# Patient Record
Sex: FEMALE | Race: WHITE | Employment: OTHER | URBAN - METROPOLITAN AREA
[De-identification: names, ages, dates, MRNs, and addresses within clinical notes are randomized per-mention and may not be internally consistent; named-entity substitution may affect disease eponyms.]

---

## 2018-10-26 ENCOUNTER — NURSE ONLY (OUTPATIENT)
Dept: FAMILY MEDICINE CLINIC | Age: 48
End: 2018-10-26
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
## 2019-03-17 ENCOUNTER — OFFICE VISIT (OUTPATIENT)
Dept: FAMILY MEDICINE CLINIC | Age: 49
End: 2019-03-17
Payer: COMMERCIAL

## 2019-03-17 VITALS
HEART RATE: 91 BPM | TEMPERATURE: 98 F | RESPIRATION RATE: 18 BRPM | SYSTOLIC BLOOD PRESSURE: 122 MMHG | OXYGEN SATURATION: 99 % | DIASTOLIC BLOOD PRESSURE: 78 MMHG | HEIGHT: 63 IN | WEIGHT: 150 LBS | BODY MASS INDEX: 26.58 KG/M2

## 2019-03-17 DIAGNOSIS — S61.019A LACERATION OF THUMB, FOREIGN BODY PRESENCE UNSPECIFIED, NAIL DAMAGE STATUS UNSPECIFIED, UNSPECIFIED LATERALITY, INITIAL ENCOUNTER: Primary | ICD-10-CM

## 2019-03-17 PROCEDURE — 90471 IMMUNIZATION ADMIN: CPT | Performed by: NURSE PRACTITIONER

## 2019-03-17 PROCEDURE — 99214 OFFICE O/P EST MOD 30 MIN: CPT | Performed by: NURSE PRACTITIONER

## 2019-03-17 PROCEDURE — 12001 RPR S/N/AX/GEN/TRNK 2.5CM/<: CPT | Performed by: NURSE PRACTITIONER

## 2019-03-17 PROCEDURE — 90715 TDAP VACCINE 7 YRS/> IM: CPT | Performed by: NURSE PRACTITIONER

## 2019-03-17 RX ORDER — ZOLPIDEM TARTRATE 5 MG/1
TABLET ORAL
COMMUNITY
Start: 2019-03-15 | End: 2019-03-17

## 2019-03-17 RX ORDER — CEPHALEXIN 500 MG/1
500 CAPSULE ORAL 3 TIMES DAILY
Qty: 21 CAPSULE | Refills: 0 | Status: SHIPPED | OUTPATIENT
Start: 2019-03-17 | End: 2020-05-14

## 2019-03-17 RX ORDER — ALPRAZOLAM 0.5 MG/1
0.5 TABLET ORAL
COMMUNITY
Start: 2019-02-15 | End: 2019-03-17

## 2019-03-17 RX ORDER — DEXTROAMPHETAMINE SACCHARATE, AMPHETAMINE ASPARTATE, DEXTROAMPHETAMINE SULFATE AND AMPHETAMINE SULFATE 5; 5; 5; 5 MG/1; MG/1; MG/1; MG/1
20 TABLET ORAL
COMMUNITY
Start: 2019-03-16 | End: 2019-03-17

## 2020-02-17 PROBLEM — B27.90 EPSTEIN BARR INFECTION: Status: ACTIVE | Noted: 2020-02-17

## 2020-03-25 NOTE — PROGRESS NOTES
customers then call her for consultations over the phone. With the time difference her consultations occur during the middle of the night to the morning hours. Her sleep schedule is very erratic, often she is up all night long and will go to bed at 9am. Or will sleep for short periods of time on the couch, at times will go many days without sleeping in her bed. She denies travelling outside of [place] prior to onset of symptoms  No known tick bites, but she does have a history of spending significant time in the woods. No chills, fevers, night sweats, weight loss or malaise. Pt admits to snoring and has never had a sleep study    Her medication regimen includes Adderall daily, Ambien at HS prn and Xanax prn (she takes at least BID). Patient was advised that poor sleep patterns, chronic sleep deprivation combined with sleep aids and stimulants are a dangerous combination and should be avoided. She is encouraged to establish a regular sleep regimen and engage in good sleep hygiene. Pt has also been referred to Pulmonary/sleep medicine for evaluation since she needs to establish a baseline for her sleep pattern. If her fatigue is secondary to chronic sleep deprivation it will not resolve without establishing a regular sleep pattern. Tests to rule out infectious processes that could contribute to her fatigue have been ordered. I have indicated that it is unlikely an infection would account for her symptoms but will test to eliminate such potential causes. Long discussion held with her and her . She is asked to return to the office in 4-6 weeks. · DISCUSSION:   43 y/o lady with chronic fatigue and difficulty staying awake x 1 year. · Now with loss of dexterity and tingling of her fingers and brain fog  · Does not have regular sleep hours, and has poor sleep hygiene  · Takes stimulants and sleep aids as needed  · EBV IgG has been elevated since 2013.  Will evaluate to see if an acute level: Not on file   Occupational History    Not on file   Social Needs    Financial resource strain: Not on file    Food insecurity     Worry: Not on file     Inability: Not on file    Transportation needs     Medical: Not on file     Non-medical: Not on file   Tobacco Use    Smoking status: Never Smoker    Smokeless tobacco: Never Used   Substance and Sexual Activity    Alcohol use: No    Drug use: Not on file    Sexual activity: Not on file   Lifestyle    Physical activity     Days per week: Not on file     Minutes per session: Not on file    Stress: Not on file   Relationships    Social connections     Talks on phone: Not on file     Gets together: Not on file     Attends Latter day service: Not on file     Active member of club or organization: Not on file     Attends meetings of clubs or organizations: Not on file     Relationship status: Not on file    Intimate partner violence     Fear of current or ex partner: Not on file     Emotionally abused: Not on file     Physically abused: Not on file     Forced sexual activity: Not on file   Other Topics Concern    Not on file   Social History Narrative    Not on file       Family History:     Family History   Problem Relation Age of Onset    Thyroid Disease Mother     Diabetes Father     Arthritis Father     Heart Disease Father         Allergies:   Codeine; Morphine; and Penicillins     Review of Systems:   Constitutional: No fevers or chills. No systemic complaints  Head: No headaches  Eyes: No double vision or blurry vision. ENT: No sore throat or runny nose. . No hearing loss, tinnitus or vertigo. Cardiovascular: No chest pain or palpitations. No shortness of breath. No BUI  Lung: No shortness of breath or cough. No sputum production  Abdomen: No nausea, vomiting, diarrhea, or abdominal pain. .  Genitourinary: No increased urinary frequency, or dysuria. No hematuria. No suprapubic or CVA pain  Musculoskeletal: No muscle aches or pains.  No joint

## 2020-03-26 ENCOUNTER — OFFICE VISIT (OUTPATIENT)
Dept: INFECTIOUS DISEASES | Age: 50
End: 2020-03-26
Payer: COMMERCIAL

## 2020-03-26 ENCOUNTER — HOSPITAL ENCOUNTER (OUTPATIENT)
Age: 50
Setting detail: SPECIMEN
Discharge: HOME | End: 2020-03-26
Payer: COMMERCIAL

## 2020-03-26 VITALS
SYSTOLIC BLOOD PRESSURE: 105 MMHG | DIASTOLIC BLOOD PRESSURE: 66 MMHG | TEMPERATURE: 97.5 F | HEIGHT: 63 IN | HEART RATE: 113 BPM | BODY MASS INDEX: 27 KG/M2 | WEIGHT: 152.4 LBS

## 2020-03-26 PROCEDURE — 99204 OFFICE O/P NEW MOD 45 MIN: CPT | Performed by: INTERNAL MEDICINE

## 2020-03-26 NOTE — LETTER
[facility] [address] [address] [facility] [address]  Phone: [phone]  Fax: [phone]    PCP: FLORES Clement CNP   CC providers:  FLORES Clement CNP  [address] [place] [address] [address] [id number]  VIA Facsimile: [phone]     Katty Underwood MD  [address] [facility] [address]  VIA Mail    March 28, 2020       Patient: Paula Flores   MR Number: [id number]   YOB: 1970   Date of Visit: 3/26/2020     Dear Sandy Suarez: Thank you for allowing me to see your patient Ms. Paula Flores. Below are the relevant portions of my assessment and plan of care. [facility] - Initial Office Consult Note  Today's Date and Time: 3/26/2020, 5:13 PM    Diagnostic Impression :     1. Ida Shipman infection    2. Fatigue, unspecified type        Recommendations ·   Practice good sleep hygiene  · Develop a regular sleep pattern  · Refer to pulmonary/sleep medicine for evaluation  · Check EBV antibody panel, Lyme antibodies, CMV antibody panel, Mycoplasma antibodies  · RTO 4-6  weeks    Chief complaint/reason for consultation:     Chief Complaint   Patient presents with    Frequent Infections     Follow up       History of Present Illness:   Paula Flores is a 44y.o.-year-old  female who was initially evaluated on 3/26/2020. Patient seen at the request of Minda Tyler NP    INITIAL HISTORY:   Pt is a 45 yo woman who presents with a 1 year history of extreme fatigue. She states that it is so extreme that she falls asleep 'everywhere'; at the dinner table, in a restaurant, while driving and when speaking to people. The excessive sleepiness is exacerbated when she is stressed. She states that over the past few months the extreme fatigue has significantly worsened and she she has developed numbness and tingling in her fingertips with a loss of dexterity.   She also feels generally weak and will sometimes lose the strength in her legs and fall down. Over the past 6 weeks she has felt as if she is in a 'fog' and is unable to think clearly. Her PCP suggested that she see ID because of a positive EBV IgG antibody. Labs   EBV IgG 4.2  EBV IgM 0.2    Review of the chart shows that her EBV IgG was also elevated in 2013, the IgM was wnl. This would imply past infection but to exclude a relapse of prior infection we would need to obtain an early antigen level. 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 Her  indicates that she has a program that airs in [facility] and that many of her customers then call her for consultations over the phone. With the time difference her consultations occur during the middle of the night to the morning hours. Her sleep schedule is very erratic, often she is up all night long and will go to bed at 9am. Or will sleep for short periods of time on the couch, at times will go many days without sleeping in her bed. She denies travelling outside of [place] prior to onset of symptoms  No known tick bites, but she does have a history of spending significant time in the woods. No chills, fevers, night sweats, weight loss or malaise. Pt admits to snoring and has never had a sleep study    Her medication regimen includes Adderall daily, Ambien at HS prn and Xanax prn (she takes at least BID). Patient was advised that poor sleep patterns, chronic sleep deprivation combined with sleep aids and stimulants are a dangerous combination and should be avoided. She is encouraged to establish a regular sleep regimen and engage in good sleep hygiene. Pt has also been referred to Pulmonary/sleep medicine for evaluation since she needs to establish a baseline for her sleep pattern. If her fatigue is secondary to chronic sleep deprivation it will not resolve without establishing a regular sleep pattern. Tests to rule out infectious processes that could contribute to her fatigue have been ordered. I have indicated that it is unlikely an infection would account for her symptoms but will test to eliminate such potential causes. Long discussion held with her and her . She is asked to return to the office in 4-6 weeks. · DISCUSSION:   45 y/o lady with chronic fatigue and difficulty staying awake x 1 year. · Now with loss of dexterity and tingling of her fingers and brain fog  · Does not have regular sleep hours, and has poor sleep hygiene  · Takes stimulants and sleep aids as needed  · EBV IgG has been elevated since . Will evaluate to see if an acute exacerbation is present. PLAN:  · Practice good sleep hygiene  · Develop a regular sleep pattern  · Refer to pulmonary/sleep medicine for evaluation  · Check EBV antibody panel, Lyme antibodies, CMV antibody panel, Mycoplasma antibodies  · RTO 4-6  weeks      Patient seen face to face for a period of 45 min, of which more than 50% of the time was spent on counseling, explanation of diagnosis, planning of further management, and answering all questions  . I have personally reviewed the past medical history, past surgical history, medications, social history, and family history, and I have updated the database accordingly. Past Medical History:     Past Medical History:   Diagnosis Date    ADHD     Anxiety     Cholelithiases     Depression     Endometriosis     Earnest Barr infection     Fibromyalgia     H/O hemorrhoids     HTN (hypertension)     Hyperlipidemia     Skin cancer     Stroke (cerebrum) (HCC)     UTI (urinary tract infection)        Past Surgical  History:     Past Surgical History:   Procedure Laterality Date     SECTION      CHOLECYSTECTOMY      ENDOMETRIAL ABLATION      LIVER SURGERY      RESECTION LIVER segment 5-6/intraopertive ultrasound N/A 2016.  Dr Jonas Grade

## 2020-03-27 LAB
CMV IGM: 0.5
CYTOMEGALOVIRUS IGG ANTIBODY: 10.5
EBV EARLY ANTIGEN IGG: 33 U/ML
EBV INTERPRETATION: ABNORMAL
EBV NUCLEAR AG AB: 268 U/ML
EPSTEIN-BARR VCA IGG: 678 U/ML
EPSTEIN-BARR VCA IGM: 23 U/ML
MYCOPLASMA PNEUMONIAE IGG: 1.07
MYCOPLASMA PNEUMONIAE IGM: 0.4

## 2020-03-30 ENCOUNTER — TELEPHONE (OUTPATIENT)
Dept: INFECTIOUS DISEASES | Age: 50
End: 2020-03-30

## 2020-03-31 LAB — LYME ANTIBODY: 0.29

## 2020-04-01 RX ORDER — DOXYCYCLINE HYCLATE 100 MG
100 TABLET ORAL 2 TIMES DAILY
Qty: 14 TABLET | Refills: 0 | Status: SHIPPED | OUTPATIENT
Start: 2020-04-01 | End: 2020-05-01

## 2020-05-14 ENCOUNTER — TELEMEDICINE (OUTPATIENT)
Dept: INFECTIOUS DISEASES | Age: 50
End: 2020-05-14
Payer: COMMERCIAL

## 2020-05-14 VITALS — WEIGHT: 150 LBS | BODY MASS INDEX: 26.58 KG/M2 | TEMPERATURE: 98 F | HEIGHT: 63 IN

## 2020-05-14 PROCEDURE — 99214 OFFICE O/P EST MOD 30 MIN: CPT | Performed by: INTERNAL MEDICINE

## 2020-05-14 RX ORDER — AZITHROMYCIN 250 MG/1
250 TABLET, FILM COATED ORAL DAILY
COMMUNITY
Start: 2020-05-11

## 2020-05-14 RX ORDER — TRAMADOL HYDROCHLORIDE 50 MG/1
TABLET ORAL
COMMUNITY
Start: 2020-05-07

## 2020-05-14 RX ORDER — ESCITALOPRAM OXALATE 10 MG/1
10 TABLET ORAL DAILY
Qty: 30 TABLET | Refills: 3 | Status: SHIPPED | OUTPATIENT
Start: 2020-05-14 | End: 2020-08-31

## 2020-05-14 ASSESSMENT — ENCOUNTER SYMPTOMS
GASTROINTESTINAL NEGATIVE: 1
COUGH: 1

## 2020-05-14 NOTE — PROGRESS NOTES
Positive                 >120  U/mL       Equivocal              100-120 U/mL   Guidelines for the Interpretation of Earnest-Wahl Viral Serologies                                       Antibodies   Clinical Situation          IgG-VCA    EBNA      EA    IgM-VCA   _______________________________________________________________   No past infection             -         -        -       -   Acute infection               +         -        +       +   Convalescent phase            +         +       +/-     +/-   Past infection                +         +        -       -   Chronic or reactivated        +         +        +       -       infection     +  = Antibody present    - = Antibody absent   Reference: Clinical Diagnosis and Management by Laboratory Methods;   17th Edition, Balbina Blackwood M.D. Impression:     1. Fatigue, unspecified type    2. Derald Demario infection    3. Anxiety         Recommendations   · I had a lengthy discussion with Shahriar Lias and I did certainly feel that she had an anxiety component to her fatigue and stress. · We had a lengthy discussion about SSRI use and she reports trying 1 previously that did not make her feel good. · We did agree to start Lexapro 10 mg daily and we discussed that this may take a few weeks to get on board and to see an effect. · We did discuss her previous lab testing including EBV, CMV and mycoplasma testing and all of the findings were consistent with past infection  · The patient did talk about the numbness bothering her and she reports that she had previously used Savella and I was reluctant to prescribe this given that this is similar to the SSRI  · This medication is also been used for chronic fatigue but the patient was willing to try the Lexapro first and assess the progress  · Patient did also have Lyme testing which was negative    I have ordered the following medications/ labs:  No orders of the defined types were placed in this encounter.

## 2020-06-01 ENCOUNTER — VIRTUAL VISIT (OUTPATIENT)
Dept: INFECTIOUS DISEASES | Age: 50
End: 2020-06-01

## 2020-06-01 VITALS — HEIGHT: 63 IN | TEMPERATURE: 98.6 F | BODY MASS INDEX: 27.46 KG/M2 | WEIGHT: 155 LBS

## 2020-06-01 PROCEDURE — 99214 OFFICE O/P EST MOD 30 MIN: CPT | Performed by: INTERNAL MEDICINE

## 2020-06-01 ASSESSMENT — ENCOUNTER SYMPTOMS
COUGH: 1
GASTROINTESTINAL NEGATIVE: 1
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
## 2020-08-31 ENCOUNTER — TELEPHONE (OUTPATIENT)
Dept: INFECTIOUS DISEASES | Age: 50
End: 2020-08-31

## 2020-08-31 RX ORDER — ESCITALOPRAM OXALATE 10 MG/1
TABLET ORAL
Qty: 30 TABLET | Refills: 0 | Status: SHIPPED | OUTPATIENT
Start: 2020-08-31 | End: 2020-09-25

## 2020-08-31 NOTE — TELEPHONE ENCOUNTER
Dr Prateek White, patient is current and due in for an appointment on 9/1/20 with Dr Norberto Warner. Per last dictation patient is on this medication. Please sign for refill if ok. Thank you.

## 2020-08-31 NOTE — TELEPHONE ENCOUNTER
Pt called office expressing concern that her daughter took her medication. Daughter left for [place] and Yao Schofield has been with out her Lexapro. She was very confused, apologized for not remembering who filled for her. Rx was just filled today, I informed her we wouldn't be able to fill another. She then realized she never picked Rx up. Pt upset with her daughters problems, advised her to call PCP to discuss possible referral for consoling. Pt voiced understanding and agreed.

## 2020-09-07 NOTE — PROGRESS NOTES
[facility] - Office Note  Today's Date and Time: 9/8/2020, 3:51 PM    Diagnostic Impression :     1. Thermon Goes infection    2. Chronic fatigue syndrome    3. Anxiety        Recommendations   · Practice good sleep hygiene  · Develop a regular sleep pattern  · Refer to pulmonary/sleep medicine for evaluation  · Avoid scratching the skin    Chief complaint/reason for consultation:     Chief Complaint   Patient presents with    Follow-up     timmy barr       History of Present Illness:   Elise Cervantes is a 44y.o.-year-old  female who was initially evaluated on 9/8/2020. Patient seen at the request of Aria Sherman NP    INITIAL HISTORY:     Pt is a 45 yo woman who presented with a 1 year history of extreme fatigue. She states that it is so extreme that she falls asleep 'everywhere'; at the dinner table, in a restaurant, while driving and when speaking to people. The excessive sleepiness is exacerbated when she is stressed. She states that over the past few months the extreme fatigue has significantly worsened and she she has developed numbness and tingling in her fingertips with a loss of dexterity. She also feels generally weak and will sometimes lose the strength in her legs and fall down. Over the past 6 weeks she has felt as if she is in a 'fog' and is unable to think clearly. Her PCP suggested that she see ID because of a positive EBV IgG antibody. .    Labs   EBV IgG 4.2  EBV IgM 0.2    Review of the chart shows that her EBV IgG was also elevated in 2013, the IgM was wnl. This would imply past infection but to exclude a relapse of prior infection we would need to obtain an early antigen level. 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 Her  indicates that she has a program that airs in [facility] and that many of her customers then call her for consultations over the phone.  With the time difference her consultations occur during the middle of the night to the early morning hours. Her sleep schedule is very erratic, often she is up all night long and will go to bed at 9am. Or will sleep for short periods of time on the couch, at times will go many days without sleeping in her bed. She denies travelling outside of [place] prior to onset of symptoms  No known tick bites, but she does have a history of spending significant time in the woods. No chills, fevers, night sweats, weight loss or malaise. Pt admits to snoring and has never had a sleep study    Her medication regimen includes Adderall daily, Ambien at HS prn and Xanax prn (she takes at least BID). Patient was advised that poor sleep patterns, chronic sleep deprivation combined with sleep aids and stimulants are a dangerous combination and should be avoided. She is encouraged to establish a regular sleep regimen and engage in good sleep hygiene. Pt has also been referred to Pulmonary/sleep medicine for evaluation since she needs to establish a baseline for her sleep pattern. If her fatigue is secondary to chronic sleep deprivation it will not resolve without establishing a regular sleep pattern. Tests to rule out infectious processes that could contribute to her fatigue have been ordered. I have indicated that it is unlikely an infection would account for her symptoms but will test to eliminate such potential causes. CURRENT EVALUATION : 9-8-20    Patient returns to the office for follow up. She indicates that her symptoms are still present but have improved a little. She still feels very anxious. Has developed skin rashes and hives which she attributes to the stress. In addition she has been dealing with a diagnosis of bulimia in her daughter. This has caused significant stress. She also has been stressed by the smell of tobacco used by her niece which produces irritation of her airways.     She feels she has fibromyalgia and current or ex partner: Not on file     Emotionally abused: Not on file     Physically abused: Not on file     Forced sexual activity: Not on file   Other Topics Concern    Not on file   Social History Narrative    Not on file       Family History:     Family History   Problem Relation Age of Onset    Thyroid Disease Mother     Diabetes Father     Arthritis Father     Heart Disease Father         Allergies:   Codeine; Morphine; and Penicillins     Review of Systems:   Constitutional: No fevers or chills. No systemic complaints  Head: No headaches  Eyes: No double vision or blurry vision. ENT: No sore throat or runny nose. . No hearing loss, tinnitus or vertigo. Cardiovascular: No chest pain or palpitations. No shortness of breath. No BUI  Lung: No shortness of breath or cough. No sputum production  Abdomen: No nausea, vomiting, diarrhea, or abdominal pain. .  Genitourinary: No increased urinary frequency, or dysuria. No hematuria. No suprapubic or CVA pain  Musculoskeletal: No muscle aches or pains. No joint effusions, swelling or deformities  Hematologic: No bleeding or bruising. Neurologic: No headache, weakness, numbness, or tingling. Physical Examination :   /75 (Site: Left Upper Arm, Position: Sitting, Cuff Size: Medium Adult)   Pulse 109   Temp 98.9 °F (37.2 °C) (Temporal)   Resp 16   Ht 5' 3\" (1.6 m)   Wt 155 lb 12.8 oz (70.7 kg)   SpO2 97% Comment: room air at rest  BMI 27.60 kg/m²    General Appearance: Awake, alert, and in no apparent distress  Head:  Normocephalic, no trauma  Eyes: Pupils equal, round, reactive, to light and accommodation; extraocular movements intact; sclera anicteric; conjunctivae pink. No embolic phenomena. ENT: Oropharynx clear, without erythema, exudate, or thrush. No tenderness of sinuses. Mouth/throat: mucosa pink and moist. No lesions. Dentition in good repair. Neck:Supple, without lymphadenopathy. Thyroid normal, No bruits.   Pulmonary/Chest: Clear to auscultation, without wheezes, rales, or rhonchi. No dullness to percussion. Cardiovascular: Regular rate and rhythm without murmurs, rubs, or gallops. Abdomen: Soft, non tender. Bowel sounds normal. No organomegaly  All four Extremities: No cyanosis, clubbing, edema, or effusions. Neurologic: No gross sensory or motor deficits. Skin: Warm and dry with good turgor. No signs of peripheral arterial or venous insufficiency. Medical Decision Making:   I have independently reviewed/ordered the following labs:    CBC with Differential:  No results found for: WBC, HGB, HCT, PLT, SEGSPCT, BANDSPCT, LYMPHOPCT, MONOPCT, EOSPCT  BMP:   No results found for: NA, K, CL, CO2, BUN, CREATININE, MG  Hepatic Function Panel:  No results found for: PROT, LABALBU, BILIDIR, IBILI, BILITOT, ALKPHOS, ALT, AST  No results found for: RPR  No results found for: HIV  No results found for: BC  No results found for: MUCUS, PH, RBC, TRICHOMONAS, WBC, YEAST, TURBIDITY  No results found for: CREATININE, GLUCOSE, KETONES    Thank you for allowing us to participate in the care of this patient. Please call with questions.     Nitesh Morgan MD  Pager: [phone] - Office: [phone]

## 2020-09-08 ENCOUNTER — OFFICE VISIT (OUTPATIENT)
Dept: INFECTIOUS DISEASES | Age: 50
End: 2020-09-08
Payer: COMMERCIAL

## 2020-09-08 VITALS
RESPIRATION RATE: 16 BRPM | WEIGHT: 155.8 LBS | SYSTOLIC BLOOD PRESSURE: 104 MMHG | OXYGEN SATURATION: 97 % | DIASTOLIC BLOOD PRESSURE: 75 MMHG | HEART RATE: 109 BPM | HEIGHT: 63 IN | BODY MASS INDEX: 27.61 KG/M2 | TEMPERATURE: 98.9 F

## 2020-09-08 PROCEDURE — 99214 OFFICE O/P EST MOD 30 MIN: CPT | Performed by: INTERNAL MEDICINE

## 2020-09-25 RX ORDER — ESCITALOPRAM OXALATE 10 MG/1
TABLET ORAL
Qty: 30 TABLET | Refills: 0 | Status: SHIPPED | OUTPATIENT
Start: 2020-09-25 | End: 2020-10-20

## 2020-09-25 NOTE — TELEPHONE ENCOUNTER
Dr Mia Simmonds, last filled by you on 8/31/20. No follow up scheduled. Per telephone encounter from Darryl Arias on 8/31/20:    Pt called office expressing concern that her daughter took her medication. Daughter left for [place] and Bobby Sibley has been with out her Lexapro. She was very confused, apologized for not remembering who filled for her. Rx was just filled today, I informed her we wouldn't be able to fill another. She then realized she never picked Rx up. Pt upset with her daughters problems, advised her to call PCP to discuss possible referral for consoling. Pt voiced understanding and agreed. Are you wanting this refill request to go to PCP? Thanks.

## 2020-10-20 RX ORDER — ESCITALOPRAM OXALATE 10 MG/1
TABLET ORAL
Qty: 30 TABLET | Refills: 0 | Status: SHIPPED | OUTPATIENT
Start: 2020-10-20

## 2020-10-20 NOTE — TELEPHONE ENCOUNTER
Dr Delores Cartagena, last filled by you on 9/25/20. No follow up scheduled. Are you wanting to continue to refill this request or send to PCP? Thanks.

## 2022-03-20 ENCOUNTER — APPOINTMENT (OUTPATIENT)
Dept: GENERAL RADIOLOGY | Age: 52
End: 2022-03-20
Payer: COMMERCIAL

## 2022-03-20 ENCOUNTER — HOSPITAL ENCOUNTER (EMERGENCY)
Age: 52
Discharge: HOME | End: 2022-03-20
Attending: EMERGENCY MEDICINE
Payer: COMMERCIAL

## 2022-03-20 ENCOUNTER — APPOINTMENT (OUTPATIENT)
Dept: CT IMAGING | Age: 52
End: 2022-03-20
Payer: COMMERCIAL

## 2022-03-20 VITALS
HEIGHT: 63 IN | RESPIRATION RATE: 16 BRPM | WEIGHT: 142 LBS | TEMPERATURE: 97.5 F | BODY MASS INDEX: 25.16 KG/M2 | SYSTOLIC BLOOD PRESSURE: 127 MMHG | HEART RATE: 80 BPM | OXYGEN SATURATION: 100 % | DIASTOLIC BLOOD PRESSURE: 71 MMHG
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
## 2022-03-20 DIAGNOSIS — S40.011A CONTUSION OF RIGHT SHOULDER, INITIAL ENCOUNTER: ICD-10-CM

## 2022-03-20 PROCEDURE — 70450 CT HEAD/BRAIN W/O DYE: CPT

## 2022-03-20 PROCEDURE — 72125 CT NECK SPINE W/O DYE: CPT

## 2022-03-20 PROCEDURE — 71045 X-RAY EXAM CHEST 1 VIEW: CPT

## 2022-03-20 PROCEDURE — 73030 X-RAY EXAM OF SHOULDER: CPT

## 2022-03-20 PROCEDURE — 99284 EMERGENCY DEPT VISIT MOD MDM: CPT

## 2022-03-20 ASSESSMENT — PAIN SCALES - GENERAL: PAINLEVEL_OUTOF10: 9

## 2022-03-20 ASSESSMENT — PAIN - FUNCTIONAL ASSESSMENT: PAIN_FUNCTIONAL_ASSESSMENT: 0-10

## 2022-03-20 ASSESSMENT — ENCOUNTER SYMPTOMS
FACIAL SWELLING: 0
ABDOMINAL DISTENTION: 0
ABDOMINAL PAIN: 0
CHEST TIGHTNESS: 0
EYE DISCHARGE: 0
SHORTNESS OF BREATH: 0
BACK PAIN: 0
EYE PAIN: 0

## 2022-10-20 ENCOUNTER — TRANSCRIBE ORDERS (OUTPATIENT)
Dept: ADMINISTRATIVE | Age: 52
End: 2022-10-20

## 2022-10-20 DIAGNOSIS — Z12.31 ENCOUNTER FOR SCREENING MAMMOGRAM FOR MALIGNANT NEOPLASM OF BREAST: Primary | ICD-10-CM
